# Patient Record
Sex: MALE | Race: WHITE
[De-identification: names, ages, dates, MRNs, and addresses within clinical notes are randomized per-mention and may not be internally consistent; named-entity substitution may affect disease eponyms.]

---

## 2019-10-24 ENCOUNTER — HOSPITAL ENCOUNTER (EMERGENCY)
Dept: HOSPITAL 25 - ED | Age: 34
Discharge: HOME | End: 2019-10-24
Payer: COMMERCIAL

## 2019-10-24 VITALS — SYSTOLIC BLOOD PRESSURE: 127 MMHG | DIASTOLIC BLOOD PRESSURE: 89 MMHG
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
## 2019-10-24 DIAGNOSIS — S30.862A: Primary | ICD-10-CM

## 2019-10-24 PROCEDURE — 99281 EMR DPT VST MAYX REQ PHY/QHP: CPT

## 2019-10-24 NOTE — ED
Skin Complaint





- HPI Summary


HPI Summary: 





This pt is a 33 y/o male [place] [facility] [place] tick on penis. Pt reports he 

noticed the tick about 1 hour PTA at around 1700 today. He notes he pulled out 

part of the tick but was unable to get the whole thing. Pt states he walked 

yesterday and today outside and might have been exposed to ticks then. He 

thinks he might have been exposed yesterday when he wore his shirt all day 

after walking outside. Denies any other symptoms.


Denies any PMHx.


Pt admits to occasional alcohol and marijuana use. Denies tobacco use. 


NKDA. 


Pt works as a  at [employer]. 





Medications reviewed. Allergies noted. 





- History of Current Complaint


Chief Complaint: EDGeneral


Time Seen by Provider: 10/24/19 17:58


Stated Complaint: NEEDS TICK REMOVED PER PT


Hx Obtained From: Patient


Onset/Duration: Started Hours Ago, Still Present


Skin Exposure Onset/Duration: Hours Ago


Timing: Lasting Hours


Current Severity: None


Pain Intensity: 0


Pain Scale Used: 0-10 Numeric


Skin Location: Other: - penis


Aggravating Symptom(s): Nothing


Alleviating Symptom(s): Nothing


Associated Signs & Symptoms: Negative


Related History: Insect Bite/Sting - Tick bite





- Allergy/Home Medications


Allergies/Adverse Reactions: 


 Allergies











Allergy/AdvReac Type Severity Reaction Status Date / Time


 


No Known Allergies Allergy   Verified 10/24/19 17:43














PMH/Surg Hx/FS Hx/Imm Hx


Endocrine/Hematology History: 


   Denies: Hx Diabetes


Cardiovascular History: 


   Denies: Hx Hypertension





- Surgical History


Surgical History: Yes


Surgery Procedure, Year, and Place: Tonsillectomy





- Immunization History


Immunizations Up to Date: Yes


Infectious Disease History: No


Infectious Disease History: 


   Denies: Traveled Outside the [place] in Last 30 Days





- Family History


Known Family History: Positive: Non-Contributory





- Social History


Alcohol Use: Occasionally


Substance Use Type: Reports: Marijuana


Substance Use Comment - Amount & Last Used: occasionally


Smoking Status (MU): Never Smoked Tobacco





Review of Systems


Negative: Fever


Eyes: Negative


ENT: Negative


Cardiovascular: Negative


Respiratory: Negative


Skin: Other - POSITIVE: tick on penis


All Other Systems Reviewed And Are Negative: Yes





Physical Exam





- Summary


Physical Exam Summary: 





Constitutional: Well-developed, Well-nourished, Alert. (-) Distressed


Skin: Warm, Dry. Tick attached just proximal to the gland of the penis on the 

dorsal aspect. The body has been ripped off.


HENT: Normocephalic; Atraumatic


Eyes: Conjunctiva normal


Neck: Musculoskeletal ROM normal neck. (-) JVD, (-) Stridor, (-) Tracheal 

deviation


Cardio: Rhythm regular, rate normal, Heart sounds normal; Intact distal pulses; 

The pedal pulses are 2+ and symmetric. Radial pulses are 2+ and symmetric. (-) 

Murmur


Pulmonary/Chest wall: Effort normal. (-) Respiratory distress, (-) Wheezes, (-) 

Rales


Abd: Soft, (-) tenderness, (-) Distension, (-) Guarding, (-) Rebound


Musculoskeletal: (-) Edema


Lymph: (-) Cervical adenopathy


Neuro: Alert, Oriented x3


Psych: Mood and affect Normal


Triage Information Reviewed: Yes


Vital Signs On Initial Exam: 


 Initial Vitals











Temp Pulse Resp BP Pulse Ox


 


 98.7 F   80   18   133/90   99 


 


 10/24/19 17:36  10/24/19 17:36  10/24/19 17:36  10/24/19 17:36  10/24/19 17:36











Vital Signs Reviewed: Yes





Procedures





- Sedation


Patient Received Moderate/Deep Sedation with Procedure: No





Diagnostics





- Vital Signs


 Vital Signs











  Temp Pulse Resp BP Pulse Ox


 


 10/24/19 17:36  98.7 F  80  18  133/90  99














- Laboratory


Lab Statement: Any lab studies that have been ordered have been reviewed, and 

results considered in the medical decision making process.





Course/Dx





- Course


Course Of Treatment: Patient is here with a tick on his penis.  Patient is not 

sure how long it has been on there.  Patient self remove the body of the tick 

and did not think it was engorged.  Patient's excess removal of his stick.  

Patient sure doxycycline 200 mg as he is very nervous about possible infection.





- Diagnoses


Provider Diagnoses: 


 Tick bite








Discharge ED





- Sign-Out/Discharge


Documenting (check all that apply): Patient Departure - Discharge home





- Discharge Plan


Condition: Stable


Disposition: HOME


Patient Education Materials:  Tick Bite (ED)


Referrals: 


[facility] [Outside]


Additional Instructions: 


PLEASE RETURN TO EMERGENCY DEPARTMENT FOR ANY NEW OR WORSENING SYMPTOMS. 








Please follow up with your primary care physician in 1-3 days.





- Billing Disposition and Condition


Condition: STABLE


Disposition: Home





- Attestation Statements


Document Initiated by Ta: Yes


Documenting Scribe: Collette Bower


Provider For Whom Scribe is Documenting (Include Credential): Gold Jerome MD


Scribe Attestation: 


I, Collette Bower, scribed for Gold Jerome MD on 10/24/19 at 1903. 


Scribe Documentation Reviewed: Yes


Provider Attestation: 


The documentation as recorded by the Collette cheng accurately reflects 

the service I personally performed and the decisions made by me, Gold Jerome MD


Status of Scribe Document: Viewed